# Patient Record
Sex: MALE | Race: WHITE | NOT HISPANIC OR LATINO | URBAN - METROPOLITAN AREA
[De-identification: names, ages, dates, MRNs, and addresses within clinical notes are randomized per-mention and may not be internally consistent; named-entity substitution may affect disease eponyms.]

---

## 2020-09-23 ENCOUNTER — OFFICE VISIT (OUTPATIENT)
Dept: URBAN - METROPOLITAN AREA CLINIC 63 | Facility: CLINIC | Age: 50
End: 2020-09-23

## 2021-03-10 ENCOUNTER — OFFICE VISIT (OUTPATIENT)
Dept: URBAN - METROPOLITAN AREA CLINIC 63 | Facility: CLINIC | Age: 51
End: 2021-03-10

## 2021-06-01 ENCOUNTER — OFFICE VISIT (OUTPATIENT)
Age: 51
End: 2021-06-01

## 2021-12-22 ENCOUNTER — TELEPHONE ENCOUNTER (OUTPATIENT)
Dept: URBAN - METROPOLITAN AREA CLINIC 9 | Facility: CLINIC | Age: 51
End: 2021-12-22

## 2021-12-23 ENCOUNTER — OFFICE VISIT (OUTPATIENT)
Dept: URBAN - METROPOLITAN AREA CLINIC 9 | Facility: CLINIC | Age: 51
End: 2021-12-23

## 2022-04-21 ENCOUNTER — TELEPHONE ENCOUNTER (OUTPATIENT)
Dept: URBAN - METROPOLITAN AREA CLINIC 9 | Facility: CLINIC | Age: 52
End: 2022-04-21

## 2022-04-29 ENCOUNTER — OFFICE VISIT (OUTPATIENT)
Dept: URBAN - METROPOLITAN AREA CLINIC 9 | Facility: CLINIC | Age: 52
End: 2022-04-29

## 2022-05-31 ENCOUNTER — LAB OUTSIDE AN ENCOUNTER (OUTPATIENT)
Age: 52
End: 2022-05-31

## 2022-06-01 ENCOUNTER — TELEPHONE ENCOUNTER (OUTPATIENT)
Dept: URBAN - METROPOLITAN AREA CLINIC 9 | Facility: CLINIC | Age: 52
End: 2022-06-01

## 2022-06-01 LAB
A/G RATIO: 1.3
AFP, SERUM, TUMOR MARKER: (no result)
ALBUMIN: (no result)
ALKALINE PHOSPHATASE: (no result)
ALT (SGPT): (no result)
AMBIG ABBREV CMP14 DEFAULT: (no result)
AST (SGOT): (no result)
BASO (ABSOLUTE): (no result)
BASOS: (no result)
BILIRUBIN, TOTAL: (no result)
BUN/CREATININE RATIO: 10
BUN: (no result)
CALCIUM: (no result)
CARBON DIOXIDE, TOTAL: (no result)
CHLORIDE: (no result)
CREATININE: (no result)
EGFR: (no result)
EOS (ABSOLUTE): (no result)
EOS: (no result)
GLOBULIN, TOTAL: (no result)
GLUCOSE: (no result)
HBSAG SCREEN: NEGATIVE
HEMATOCRIT: (no result)
HEMATOLOGY COMMENTS:: (no result)
HEMOGLOBIN: (no result)
HEP A AB, IGM: NEGATIVE
HEP A AB, TOTAL: POSITIVE
HEP C VIRUS AB: (no result)
HEPATITIS B SURF AB QUANT: (no result)
IMMATURE CELLS: (no result)
IMMATURE GRANS (ABS): (no result)
IMMATURE GRANULOCYTES: (no result)
INR: 1.1
LYMPHS (ABSOLUTE): (no result)
LYMPHS: (no result)
MCH: (no result)
MCHC: (no result)
MCV: (no result)
MONOCYTES(ABSOLUTE): (no result)
MONOCYTES: (no result)
NEUTROPHILS (ABSOLUTE): (no result)
NEUTROPHILS: (no result)
NRBC: (no result)
PLATELETS: (no result)
POTASSIUM: (no result)
PROTEIN, TOTAL: (no result)
PROTHROMBIN TIME: (no result)
RBC: (no result)
RDW: (no result)
SODIUM: (no result)
WBC: (no result)

## 2022-06-02 ENCOUNTER — TELEPHONE ENCOUNTER (OUTPATIENT)
Dept: URBAN - METROPOLITAN AREA CLINIC 9 | Facility: CLINIC | Age: 52
End: 2022-06-02

## 2022-06-03 ENCOUNTER — TELEPHONE ENCOUNTER (OUTPATIENT)
Dept: URBAN - METROPOLITAN AREA CLINIC 9 | Facility: CLINIC | Age: 52
End: 2022-06-03

## 2022-06-06 ENCOUNTER — LAB OUTSIDE AN ENCOUNTER (OUTPATIENT)
Age: 52
End: 2022-06-06

## 2022-06-06 ENCOUNTER — OFFICE VISIT (OUTPATIENT)
Dept: URBAN - METROPOLITAN AREA CLINIC 9 | Facility: CLINIC | Age: 52
End: 2022-06-06

## 2022-06-07 ENCOUNTER — TELEPHONE ENCOUNTER (OUTPATIENT)
Dept: URBAN - METROPOLITAN AREA CLINIC 9 | Facility: CLINIC | Age: 52
End: 2022-06-07

## 2022-06-07 LAB
BASO (ABSOLUTE): (no result)
BASOS: (no result)
EOS (ABSOLUTE): (no result)
EOS: (no result)
HEMATOCRIT: (no result)
HEMATOLOGY COMMENTS:: (no result)
HEMOGLOBIN: (no result)
IMMATURE CELLS: (no result)
IMMATURE GRANS (ABS): (no result)
IMMATURE GRANULOCYTES: (no result)
LYMPHS (ABSOLUTE): (no result)
LYMPHS: (no result)
MCH: (no result)
MCHC: (no result)
MCV: (no result)
MONOCYTES(ABSOLUTE): (no result)
MONOCYTES: (no result)
NEUTROPHILS (ABSOLUTE): (no result)
NEUTROPHILS: (no result)
NRBC: (no result)
PLATELETS: (no result)
RBC: (no result)
RDW: (no result)
WBC: (no result)

## 2022-06-08 ENCOUNTER — TELEPHONE ENCOUNTER (OUTPATIENT)
Dept: URBAN - METROPOLITAN AREA CLINIC 9 | Facility: CLINIC | Age: 52
End: 2022-06-08

## 2022-06-08 ENCOUNTER — OFFICE VISIT (OUTPATIENT)
Dept: URBAN - METROPOLITAN AREA CLINIC 9 | Facility: CLINIC | Age: 52
End: 2022-06-08

## 2022-06-14 ENCOUNTER — OFFICE VISIT (OUTPATIENT)
Dept: URBAN - METROPOLITAN AREA CLINIC 9 | Facility: CLINIC | Age: 52
End: 2022-06-14

## 2022-07-08 ENCOUNTER — OFFICE VISIT (OUTPATIENT)
Dept: URBAN - METROPOLITAN AREA SURGERY CENTER 9 | Facility: SURGERY CENTER | Age: 52
End: 2022-07-08

## 2022-07-09 ENCOUNTER — TELEPHONE ENCOUNTER (OUTPATIENT)
Dept: URBAN - METROPOLITAN AREA CLINIC 121 | Facility: CLINIC | Age: 52
End: 2022-07-09

## 2022-07-09 RX ORDER — METOPROLOL TARTRATE 25 MG/1
TABLET, FILM COATED ORAL ONCE A DAY
Refills: 0 | OUTPATIENT
Start: 2021-01-19 | End: 2021-03-10

## 2022-07-09 RX ORDER — METOPROLOL SUCCINATE 25 MG/1
TABLET, EXTENDED RELEASE ORAL ONCE A DAY
Refills: 0 | OUTPATIENT
Start: 2020-07-06 | End: 2020-09-23

## 2022-07-10 ENCOUNTER — TELEPHONE ENCOUNTER (OUTPATIENT)
Dept: URBAN - METROPOLITAN AREA CLINIC 121 | Facility: CLINIC | Age: 52
End: 2022-07-10

## 2022-07-10 RX ORDER — METOPROLOL TARTRATE 25 MG/1
TABLET, FILM COATED ORAL TWICE A DAY
Refills: 0 | Status: ACTIVE | COMMUNITY
Start: 2021-03-10

## 2022-07-10 RX ORDER — METOPROLOL SUCCINATE 25 MG/1
TABLET, EXTENDED RELEASE ORAL TWICE A DAY
Refills: 0 | Status: ACTIVE | COMMUNITY
Start: 2020-09-23

## 2022-07-10 RX ORDER — LIDOCAINE 50 MG/G
TAKE AS DIRECTED; AS NEEDED FOR RECTAL BLEEDING CREAM TOPICAL TAKE AS DIRECTED
Refills: 0 | Status: ACTIVE | COMMUNITY
Start: 2020-09-23

## 2022-07-30 ENCOUNTER — TELEPHONE ENCOUNTER (OUTPATIENT)
Age: 52
End: 2022-07-30

## 2022-07-30 RX ORDER — PANTOPRAZOLE 20 MG/1
1 TABLET, DELAYED RELEASE ORAL
Qty: 0 | Refills: 2 | OUTPATIENT
Start: 2022-04-29 | End: 2022-05-29

## 2022-07-30 RX ORDER — FOLIC ACID 1 MG/1
1 (ONE) TABLET ORAL DAILY
Qty: 0 | Refills: 2 | OUTPATIENT
Start: 2022-04-29 | End: 2022-05-29

## 2022-07-31 ENCOUNTER — TELEPHONE ENCOUNTER (OUTPATIENT)
Age: 52
End: 2022-07-31

## 2022-07-31 RX ORDER — DICYCLOMINE HYDROCHLORIDE 10 MG/1
1 CAPSULE ORAL TWICE A DAY
Qty: 0 | Refills: 16 | Status: ACTIVE | COMMUNITY

## 2022-07-31 RX ORDER — PANTOPRAZOLE 20 MG/1
1 TABLET, DELAYED RELEASE ORAL
Qty: 0 | Refills: 2 | Status: ACTIVE | COMMUNITY
Start: 2022-04-29

## 2022-07-31 RX ORDER — DICYCLOMINE HYDROCHLORIDE 10 MG/1
1 CAPSULE ORAL
Qty: 0 | Refills: 16 | Status: ACTIVE | COMMUNITY
Start: 2022-04-29

## 2022-07-31 RX ORDER — DICYCLOMINE HYDROCHLORIDE 10 MG/1
1 CAPSULE ORAL
Qty: 0 | Refills: 16 | Status: ACTIVE | COMMUNITY
Start: 2022-06-14

## 2022-07-31 RX ORDER — FOLIC ACID 1 MG/1
1 (ONE) TABLET ORAL DAILY
Qty: 0 | Refills: 2 | Status: ACTIVE | COMMUNITY
Start: 2022-04-29

## 2022-08-05 ENCOUNTER — TELEPHONE ENCOUNTER (OUTPATIENT)
Dept: URBAN - METROPOLITAN AREA CLINIC 7 | Facility: CLINIC | Age: 52
End: 2022-08-05

## 2022-08-08 ENCOUNTER — OFFICE VISIT (OUTPATIENT)
Dept: URBAN - METROPOLITAN AREA CLINIC 9 | Facility: CLINIC | Age: 52
End: 2022-08-08

## 2022-08-16 ENCOUNTER — WEB ENCOUNTER (OUTPATIENT)
Dept: URBAN - METROPOLITAN AREA CLINIC 9 | Facility: CLINIC | Age: 52
End: 2022-08-16

## 2022-08-22 ENCOUNTER — TELEPHONE ENCOUNTER (OUTPATIENT)
Dept: URBAN - METROPOLITAN AREA SURGERY CENTER 9 | Facility: SURGERY CENTER | Age: 52
End: 2022-08-22

## 2022-08-23 ENCOUNTER — TELEPHONE ENCOUNTER (OUTPATIENT)
Dept: URBAN - METROPOLITAN AREA SURGERY CENTER 9 | Facility: SURGERY CENTER | Age: 52
End: 2022-08-23

## 2022-08-25 ENCOUNTER — OFFICE VISIT (OUTPATIENT)
Dept: URBAN - METROPOLITAN AREA SURGERY CENTER 9 | Facility: SURGERY CENTER | Age: 52
End: 2022-08-25
Payer: COMMERCIAL

## 2022-08-25 ENCOUNTER — TELEPHONE ENCOUNTER (OUTPATIENT)
Dept: URBAN - METROPOLITAN AREA CLINIC 9 | Facility: CLINIC | Age: 52
End: 2022-08-25

## 2022-08-25 DIAGNOSIS — K74.60 ADVANCED CIRRHOSIS: ICD-10-CM

## 2022-08-25 DIAGNOSIS — K22.89 DILATATION OF ESOPHAGUS: ICD-10-CM

## 2022-08-25 DIAGNOSIS — K31.89 ACQUIRED DEFORMITY OF DUODENUM: ICD-10-CM

## 2022-08-25 DIAGNOSIS — I85.10 ESOPH VARICE OTHER DIS: ICD-10-CM

## 2022-08-25 PROCEDURE — 43235 EGD DIAGNOSTIC BRUSH WASH: CPT | Performed by: INTERNAL MEDICINE

## 2022-08-25 RX ORDER — METOPROLOL TARTRATE 25 MG/1
TABLET, FILM COATED ORAL TWICE A DAY
Refills: 0 | Status: ACTIVE | COMMUNITY
Start: 2021-03-10

## 2022-08-25 RX ORDER — METOPROLOL SUCCINATE 25 MG/1
TABLET, EXTENDED RELEASE ORAL TWICE A DAY
Refills: 0 | Status: ACTIVE | COMMUNITY
Start: 2020-09-23

## 2022-08-25 RX ORDER — FOLIC ACID 1 MG/1
1 (ONE) TABLET ORAL DAILY
Qty: 0 | Refills: 2 | Status: ACTIVE | COMMUNITY
Start: 2022-04-29

## 2022-08-25 RX ORDER — LIDOCAINE 50 MG/G
TAKE AS DIRECTED; AS NEEDED FOR RECTAL BLEEDING CREAM TOPICAL TAKE AS DIRECTED
Refills: 0 | Status: ACTIVE | COMMUNITY
Start: 2020-09-23

## 2022-08-25 RX ORDER — PANTOPRAZOLE 20 MG/1
1 TABLET, DELAYED RELEASE ORAL
Qty: 0 | Refills: 2 | Status: ACTIVE | COMMUNITY
Start: 2022-04-29

## 2022-08-25 RX ORDER — DICYCLOMINE HYDROCHLORIDE 10 MG/1
1 CAPSULE ORAL
Qty: 0 | Refills: 16 | Status: ACTIVE | COMMUNITY
Start: 2022-06-14

## 2022-09-06 ENCOUNTER — TELEPHONE ENCOUNTER (OUTPATIENT)
Dept: URBAN - METROPOLITAN AREA CLINIC 7 | Facility: CLINIC | Age: 52
End: 2022-09-06

## 2022-09-07 ENCOUNTER — TELEPHONE ENCOUNTER (OUTPATIENT)
Dept: URBAN - METROPOLITAN AREA CLINIC 7 | Facility: CLINIC | Age: 52
End: 2022-09-07

## 2022-11-02 ENCOUNTER — WEB ENCOUNTER (OUTPATIENT)
Dept: URBAN - METROPOLITAN AREA CLINIC 9 | Facility: CLINIC | Age: 52
End: 2022-11-02

## 2023-02-07 ENCOUNTER — OFFICE VISIT (OUTPATIENT)
Dept: URBAN - METROPOLITAN AREA CLINIC 9 | Facility: CLINIC | Age: 53
End: 2023-02-07
Payer: COMMERCIAL

## 2023-02-07 ENCOUNTER — TELEPHONE ENCOUNTER (OUTPATIENT)
Dept: URBAN - METROPOLITAN AREA CLINIC 7 | Facility: CLINIC | Age: 53
End: 2023-02-07

## 2023-02-07 ENCOUNTER — WEB ENCOUNTER (OUTPATIENT)
Dept: URBAN - METROPOLITAN AREA CLINIC 9 | Facility: CLINIC | Age: 53
End: 2023-02-07

## 2023-02-07 VITALS
WEIGHT: 194 LBS | BODY MASS INDEX: 22.45 KG/M2 | HEIGHT: 78 IN | SYSTOLIC BLOOD PRESSURE: 124 MMHG | DIASTOLIC BLOOD PRESSURE: 84 MMHG

## 2023-02-07 DIAGNOSIS — K62.7 RADIATION PROCTITIS: ICD-10-CM

## 2023-02-07 DIAGNOSIS — F10.10 ALCOHOL ABUSE, CONTINUOUS: ICD-10-CM

## 2023-02-07 DIAGNOSIS — K76.82 HEPATIC ENCEPHALOPATHY: ICD-10-CM

## 2023-02-07 DIAGNOSIS — K58.9 IRRITABLE BOWEL SYNDROME, UNSPECIFIED TYPE: ICD-10-CM

## 2023-02-07 DIAGNOSIS — K70.30 ALCOHOLIC CIRRHOSIS OF LIVER WITHOUT ASCITES: ICD-10-CM

## 2023-02-07 DIAGNOSIS — K21.9 GASTROESOPHAGEAL REFLUX DISEASE, UNSPECIFIED WHETHER ESOPHAGITIS PRESENT: ICD-10-CM

## 2023-02-07 PROBLEM — 13920009 HEPATIC ENCEPHALOPATHY: Status: ACTIVE | Noted: 2023-02-07

## 2023-02-07 PROCEDURE — 99214 OFFICE O/P EST MOD 30 MIN: CPT | Performed by: INTERNAL MEDICINE

## 2023-02-07 RX ORDER — RIFAXIMIN 550 MG/1
1 TABLET TABLET ORAL TWICE A DAY
Qty: 60 | Refills: 5 | OUTPATIENT
Start: 2023-02-07 | End: 2023-08-06

## 2023-02-07 RX ORDER — METOPROLOL SUCCINATE 25 MG/1
TABLET, EXTENDED RELEASE ORAL TWICE A DAY
Refills: 0 | Status: ON HOLD | COMMUNITY
Start: 2020-09-23

## 2023-02-07 RX ORDER — FOLIC ACID 1 MG/1
1 (ONE) TABLET ORAL DAILY
Qty: 0 | Refills: 2 | Status: ON HOLD | COMMUNITY
Start: 2022-04-29

## 2023-02-07 RX ORDER — PANTOPRAZOLE 20 MG/1
1 TABLET, DELAYED RELEASE ORAL
Qty: 0 | Refills: 2 | Status: ON HOLD | COMMUNITY
Start: 2022-04-29

## 2023-02-07 RX ORDER — LIDOCAINE 50 MG/G
TAKE AS DIRECTED; AS NEEDED FOR RECTAL BLEEDING CREAM TOPICAL TAKE AS DIRECTED
Refills: 0 | Status: ON HOLD | COMMUNITY
Start: 2020-09-23

## 2023-02-07 RX ORDER — DICYCLOMINE HYDROCHLORIDE 10 MG/1
1 CAPSULE ORAL
OUTPATIENT
Start: 2022-06-14

## 2023-02-07 RX ORDER — RIFAXIMIN 550 MG/1
1 TABLET TABLET ORAL TWICE A DAY
Qty: 60 | Refills: 5
Start: 2023-02-07 | End: 2023-08-12

## 2023-02-07 RX ORDER — FOLIC ACID 1 MG/1
1 (ONE) TABLET ORAL DAILY
OUTPATIENT
Start: 2022-04-29

## 2023-02-07 RX ORDER — DICYCLOMINE HYDROCHLORIDE 10 MG/1
1 CAPSULE ORAL
Qty: 0 | Refills: 16 | Status: ACTIVE | COMMUNITY
Start: 2022-06-14

## 2023-02-07 RX ORDER — METOPROLOL TARTRATE 25 MG/1
TABLET, FILM COATED ORAL TWICE A DAY
Refills: 0 | Status: ACTIVE | COMMUNITY
Start: 2021-03-10

## 2023-02-07 NOTE — HPI-TODAY'S VISIT:
Pt here for f/u of etoh cirrhosis and IBS and radiation proctitis . pt has etoh cirrhosis, still ongoing etoh Hx/o SCC anus . Dx in 2021 . 2020 Colon negative 2021 EGD with small varices 2021 Liver bx with cirrhosis and fatty liver changes ([facility]) 2022 colon with polyp and radiation proctitis 2022 EGD with duodenal AVMs . Prior IVDU, none currently . 12/2022 CT a/p negative for tumor 12/2022 MELD 10 . Still drinking 18 per day and knows he needs to quit. He has the contact information for the center. . 2022 pt with symptomatic anemia, sent to hospital and PRBC transfused from duodenal AVMS and he still is having rectal bleeding. At this point I am worried about ongoing radiation proctitis and he didn't schedule the radiation proctitis APC. I advised we reschedule and that we plan on xifaxan for HE given he failed lactulose. I will plan RTC in 3 months with repeat CBC, CMP, INR and AFP. He is agreeable. .

## 2023-02-07 NOTE — PHYSICAL EXAM NEUROLOGIC:
oriented to person, place and time , normal sensation  , cooperative with exam, normal mood with an appropriate affect , oriented to person, place and time , normal sensation  , cooperative with exam, normal mood with an appropriate affect

## 2023-02-07 NOTE — PHYSICAL EXAM GASTROINTESTINAL
Abdomen , soft, nontender, nondistended , no guarding or rigidity , no masses palpable , Liver and Spleen , no hepatomegaly present , Abdomen , soft, nontender, nondistended , no guarding or rigidity , no masses palpable , Liver and Spleen , no hepatomegaly present

## 2023-04-01 ENCOUNTER — LAB OUTSIDE AN ENCOUNTER (OUTPATIENT)
Dept: URBAN - METROPOLITAN AREA CLINIC 9 | Facility: CLINIC | Age: 53
End: 2023-04-01

## 2023-05-16 ENCOUNTER — OFFICE VISIT (OUTPATIENT)
Dept: URBAN - METROPOLITAN AREA CLINIC 9 | Facility: CLINIC | Age: 53
End: 2023-05-16
Payer: COMMERCIAL

## 2023-05-16 ENCOUNTER — WEB ENCOUNTER (OUTPATIENT)
Dept: URBAN - METROPOLITAN AREA CLINIC 9 | Facility: CLINIC | Age: 53
End: 2023-05-16

## 2023-05-16 VITALS
SYSTOLIC BLOOD PRESSURE: 124 MMHG | BODY MASS INDEX: 23.49 KG/M2 | WEIGHT: 203 LBS | DIASTOLIC BLOOD PRESSURE: 74 MMHG | HEIGHT: 78 IN

## 2023-05-16 DIAGNOSIS — K76.82 HEPATIC ENCEPHALOPATHY: ICD-10-CM

## 2023-05-16 DIAGNOSIS — K70.30 ALCOHOLIC CIRRHOSIS OF LIVER WITHOUT ASCITES: ICD-10-CM

## 2023-05-16 DIAGNOSIS — K62.7 RADIATION PROCTITIS: ICD-10-CM

## 2023-05-16 DIAGNOSIS — K55.20 SMALL BOWEL ARTERIOVENOUS MALFORMATION: ICD-10-CM

## 2023-05-16 DIAGNOSIS — F10.10 ALCOHOL ABUSE, CONTINUOUS: ICD-10-CM

## 2023-05-16 PROCEDURE — 99214 OFFICE O/P EST MOD 30 MIN: CPT | Performed by: INTERNAL MEDICINE

## 2023-05-16 RX ORDER — METOPROLOL SUCCINATE 25 MG/1
TABLET, EXTENDED RELEASE ORAL TWICE A DAY
Refills: 0 | Status: ON HOLD | COMMUNITY
Start: 2020-09-23

## 2023-05-16 RX ORDER — PANTOPRAZOLE 20 MG/1
1 TABLET, DELAYED RELEASE ORAL
Qty: 0 | Refills: 2 | Status: ON HOLD | COMMUNITY
Start: 2022-04-29

## 2023-05-16 RX ORDER — LIDOCAINE 50 MG/G
TAKE AS DIRECTED; AS NEEDED FOR RECTAL BLEEDING CREAM TOPICAL TAKE AS DIRECTED
Refills: 0 | Status: ON HOLD | COMMUNITY
Start: 2020-09-23

## 2023-05-16 RX ORDER — FOLIC ACID 1 MG/1
1 (ONE) TABLET ORAL DAILY
OUTPATIENT
Start: 2022-04-29

## 2023-05-16 RX ORDER — DICYCLOMINE HYDROCHLORIDE 10 MG/1
1 CAPSULE ORAL
Status: ACTIVE | COMMUNITY
Start: 2022-06-14

## 2023-05-16 RX ORDER — FUROSEMIDE 20 MG/1
1 TABLET TABLET ORAL ONCE A DAY
Qty: 30 | Refills: 1 | OUTPATIENT
Start: 2023-05-16

## 2023-05-16 RX ORDER — RIFAXIMIN 550 MG/1
1 TABLET TABLET ORAL TWICE A DAY
Qty: 60 | Refills: 5 | Status: ACTIVE | COMMUNITY
Start: 2023-02-07 | End: 2023-08-12

## 2023-05-16 RX ORDER — SPIRONOLACTONE 25 MG/1
TAKE 1 TABLET BY MOUTH EVERY DAY TABLET ORAL
Qty: 30 EACH | Refills: 0 | Status: ACTIVE | COMMUNITY

## 2023-05-16 RX ORDER — RIFAXIMIN 550 MG/1
1 TABLET TABLET ORAL TWICE A DAY
OUTPATIENT
Start: 2023-02-07

## 2023-05-16 RX ORDER — DICYCLOMINE HYDROCHLORIDE 10 MG/1
1 CAPSULE ORAL
Qty: 90 | Refills: 3 | OUTPATIENT
Start: 2022-06-14

## 2023-05-16 RX ORDER — METOPROLOL TARTRATE 25 MG/1
TABLET, FILM COATED ORAL TWICE A DAY
Refills: 0 | Status: ACTIVE | COMMUNITY
Start: 2021-03-10

## 2023-05-16 RX ORDER — SPIRONOLACTONE 25 MG/1
1 TABLET TABLET ORAL DAILY
Qty: 30 TABLET | Refills: 1 | OUTPATIENT

## 2023-05-16 RX ORDER — FOLIC ACID 1 MG/1
1 (ONE) TABLET ORAL DAILY
Status: ACTIVE | COMMUNITY
Start: 2022-04-29

## 2023-05-16 NOTE — HPI-TODAY'S VISIT:
Pt here for f/u of etoh cirrhosis and IBS and radiation proctitis . pt has etoh cirrhosis, still ongoing etoh Hx/o SCC anus . Dx in 2021 . 2020 Colon negative 2021 EGD with small varices 2021 Liver bx with cirrhosis and fatty liver changes ([facility]EL[facility]) 2022 colon with polyp and radiation proctitis 2022 EGD with duodenal AVMs . Prior IVDU, none currently . 12/2022 CT a/p negative for tumor 12/2022 MELD 10 4/2023 MELD 14 . Still drinking 18 per day and knows he needs to quit. He has the contact information for the center. . Pt with symptomatic anemia. Pt is having rectal bleeding and due to concern about radiation proctitis I advised about flex sig with APC. He is on xifaxan for his HE but still feels symptomatic. MELD Labs 4/2023 at 14. He is now considering scheduling the APC for radiation proctitis. I also advised about Push enteroscopy for APC. He understands and is considering ED evaluation due to ongoing GI bleeding. He needs diuretics with lasix and aldactone which we will start. RTC 6 weeks.  . .

## 2023-06-22 ENCOUNTER — TELEPHONE ENCOUNTER (OUTPATIENT)
Dept: URBAN - METROPOLITAN AREA CLINIC 9 | Facility: CLINIC | Age: 53
End: 2023-06-22

## 2023-06-22 RX ORDER — FUROSEMIDE 20 MG/1
1 TABLET TABLET ORAL ONCE A DAY
Qty: 30 | Refills: 3
Start: 2023-05-16

## 2023-06-30 ENCOUNTER — OFFICE VISIT (OUTPATIENT)
Dept: URBAN - METROPOLITAN AREA CLINIC 9 | Facility: CLINIC | Age: 53
End: 2023-06-30

## 2023-07-13 ENCOUNTER — TELEPHONE ENCOUNTER (OUTPATIENT)
Dept: URBAN - METROPOLITAN AREA CLINIC 9 | Facility: CLINIC | Age: 53
End: 2023-07-13

## 2023-07-13 RX ORDER — SPIRONOLACTONE 25 MG/1
1 TABLET TABLET ORAL DAILY
Qty: 30 TABLET | Refills: 3

## 2023-08-03 ENCOUNTER — OFFICE VISIT (OUTPATIENT)
Dept: URBAN - METROPOLITAN AREA CLINIC 9 | Facility: CLINIC | Age: 53
End: 2023-08-03
Payer: COMMERCIAL

## 2023-08-03 ENCOUNTER — TELEPHONE ENCOUNTER (OUTPATIENT)
Dept: URBAN - METROPOLITAN AREA CLINIC 9 | Facility: CLINIC | Age: 53
End: 2023-08-03

## 2023-08-03 VITALS
BODY MASS INDEX: 21.29 KG/M2 | SYSTOLIC BLOOD PRESSURE: 128 MMHG | WEIGHT: 184 LBS | DIASTOLIC BLOOD PRESSURE: 70 MMHG | HEIGHT: 78 IN

## 2023-08-03 DIAGNOSIS — K21.9 GASTROESOPHAGEAL REFLUX DISEASE, UNSPECIFIED WHETHER ESOPHAGITIS PRESENT: ICD-10-CM

## 2023-08-03 DIAGNOSIS — F10.10 ALCOHOL ABUSE, CONTINUOUS: ICD-10-CM

## 2023-08-03 DIAGNOSIS — K70.30 ALCOHOLIC CIRRHOSIS OF LIVER WITHOUT ASCITES: ICD-10-CM

## 2023-08-03 DIAGNOSIS — K62.7 RADIATION PROCTITIS: ICD-10-CM

## 2023-08-03 PROCEDURE — 99214 OFFICE O/P EST MOD 30 MIN: CPT | Performed by: INTERNAL MEDICINE

## 2023-08-03 RX ORDER — METOPROLOL TARTRATE 25 MG/1
TABLET, FILM COATED ORAL TWICE A DAY
Refills: 0 | Status: ACTIVE | COMMUNITY
Start: 2021-03-10

## 2023-08-03 RX ORDER — FUROSEMIDE 20 MG/1
1 TABLET TABLET ORAL ONCE A DAY
OUTPATIENT
Start: 2023-05-16

## 2023-08-03 RX ORDER — METOPROLOL SUCCINATE 25 MG/1
TABLET, EXTENDED RELEASE ORAL TWICE A DAY
Refills: 0 | Status: ON HOLD | COMMUNITY
Start: 2020-09-23

## 2023-08-03 RX ORDER — LIDOCAINE 50 MG/G
TAKE AS DIRECTED; AS NEEDED FOR RECTAL BLEEDING CREAM TOPICAL TAKE AS DIRECTED
Refills: 0 | Status: ON HOLD | COMMUNITY
Start: 2020-09-23

## 2023-08-03 RX ORDER — RIFAXIMIN 550 MG/1
1 TABLET TABLET ORAL TWICE A DAY
OUTPATIENT
Start: 2023-02-07

## 2023-08-03 RX ORDER — FOLIC ACID 1 MG/1
1 (ONE) TABLET ORAL DAILY
Status: ACTIVE | COMMUNITY
Start: 2022-04-29

## 2023-08-03 RX ORDER — PANTOPRAZOLE 20 MG/1
1 TABLET, DELAYED RELEASE ORAL
Qty: 0 | Refills: 2 | Status: ON HOLD | COMMUNITY
Start: 2022-04-29

## 2023-08-03 RX ORDER — FUROSEMIDE 20 MG/1
1 TABLET TABLET ORAL ONCE A DAY
Qty: 30 | Refills: 3 | Status: ACTIVE | COMMUNITY
Start: 2023-05-16

## 2023-08-03 RX ORDER — SPIRONOLACTONE 25 MG/1
1 TABLET TABLET ORAL DAILY
Qty: 30 TABLET | Refills: 3 | Status: ACTIVE | COMMUNITY

## 2023-08-03 RX ORDER — SPIRONOLACTONE 25 MG/1
1 TABLET TABLET ORAL DAILY
OUTPATIENT

## 2023-08-03 RX ORDER — DICYCLOMINE HYDROCHLORIDE 10 MG/1
1 CAPSULE ORAL
Qty: 90 | Refills: 3 | Status: ACTIVE | COMMUNITY
Start: 2022-06-14

## 2023-08-03 RX ORDER — FOLIC ACID 1 MG/1
1 (ONE) TABLET ORAL DAILY
OUTPATIENT
Start: 2022-04-29

## 2023-08-03 RX ORDER — RIFAXIMIN 550 MG/1
1 TABLET TABLET ORAL TWICE A DAY
Status: ACTIVE | COMMUNITY
Start: 2023-02-07

## 2023-08-03 NOTE — HPI-TODAY'S VISIT:
Pt here for f/u of etoh cirrhosis and IBS and radiation proctitis . pt has etoh cirrhosis, still ongoing etoh Hx/o SCC anus . Dx in 2021 . 2020 Colon negative 2021 EGD with small varices 2021 Liver bx with cirrhosis and fatty liver changes ([facility]ELMO) 2022 colon with polyp and radiation proctitis 2022 EGD with duodenal AVMs 2023 Colon with APC of extensive radiation proctitis . Prior IVDU, none currently . 12/2022 CT a/p negative for tumor 3/2023 MRI liver neg for tumor . 12/2022 MELD 10 4/2023 MELD 14 7/2023 CBC with drop in hgb to 5.8.  . Pt still drinking etoh daily and refuses to stop despite discussion of r/b/a.  . His fluid is improved on the diuretics.  . We need updated liver imaging for HCC with MRI. he will  likely need repeat APC I suspect due to ongoing bleeding after last colon. There is discussion about starting octreotide for radiation proctitis which is reasonable as this year he has needed 25 units or PRBC. He is going to speak to Dr. Ledezma to see if they can arrange the depot octreotide, otherwise we could consider octreotide 50mcg BID. He is being scheduled for a PRBC now for his hgb of 5.8 . RTC 1 mo .

## 2023-08-20 ENCOUNTER — TELEPHONE ENCOUNTER (OUTPATIENT)
Dept: URBAN - METROPOLITAN AREA CLINIC 9 | Facility: CLINIC | Age: 53
End: 2023-08-20

## 2023-09-06 ENCOUNTER — OFFICE VISIT (OUTPATIENT)
Dept: URBAN - METROPOLITAN AREA CLINIC 9 | Facility: CLINIC | Age: 53
End: 2023-09-06
Payer: COMMERCIAL

## 2023-09-06 VITALS
BODY MASS INDEX: 22.21 KG/M2 | DIASTOLIC BLOOD PRESSURE: 68 MMHG | HEIGHT: 78 IN | SYSTOLIC BLOOD PRESSURE: 136 MMHG | WEIGHT: 192 LBS

## 2023-09-06 DIAGNOSIS — K21.9 GASTROESOPHAGEAL REFLUX DISEASE WITHOUT ESOPHAGITIS: ICD-10-CM

## 2023-09-06 DIAGNOSIS — K70.30 ALCOHOLIC CIRRHOSIS OF LIVER WITHOUT ASCITES: ICD-10-CM

## 2023-09-06 DIAGNOSIS — F10.10 ALCOHOL ABUSE, CONTINUOUS: ICD-10-CM

## 2023-09-06 PROBLEM — 266435005: Status: ACTIVE | Noted: 2023-09-06

## 2023-09-06 PROCEDURE — 99214 OFFICE O/P EST MOD 30 MIN: CPT | Performed by: INTERNAL MEDICINE

## 2023-09-06 RX ORDER — METOPROLOL TARTRATE 25 MG/1
TABLET, FILM COATED ORAL TWICE A DAY
Refills: 0 | Status: ACTIVE | COMMUNITY
Start: 2021-03-10

## 2023-09-06 RX ORDER — LIDOCAINE 50 MG/G
TAKE AS DIRECTED; AS NEEDED FOR RECTAL BLEEDING CREAM TOPICAL TAKE AS DIRECTED
Refills: 0 | Status: ON HOLD | COMMUNITY
Start: 2020-09-23

## 2023-09-06 RX ORDER — RIFAXIMIN 550 MG/1
1 TABLET TABLET ORAL TWICE A DAY
Status: ACTIVE | COMMUNITY
Start: 2023-02-07

## 2023-09-06 RX ORDER — FUROSEMIDE 20 MG/1
1 TABLET TABLET ORAL ONCE A DAY
OUTPATIENT
Start: 2023-05-16

## 2023-09-06 RX ORDER — DICYCLOMINE HYDROCHLORIDE 10 MG/1
1 CAPSULE ORAL
Qty: 90 | Refills: 3 | Status: ACTIVE | COMMUNITY
Start: 2022-06-14

## 2023-09-06 RX ORDER — METOPROLOL SUCCINATE 25 MG/1
TABLET, EXTENDED RELEASE ORAL TWICE A DAY
Refills: 0 | Status: ON HOLD | COMMUNITY
Start: 2020-09-23

## 2023-09-06 RX ORDER — FUROSEMIDE 20 MG/1
1 TABLET TABLET ORAL ONCE A DAY
Status: ACTIVE | COMMUNITY
Start: 2023-05-16

## 2023-09-06 RX ORDER — RIFAXIMIN 550 MG/1
1 TABLET TABLET ORAL TWICE A DAY
OUTPATIENT
Start: 2023-02-07

## 2023-09-06 RX ORDER — FOLIC ACID 1 MG/1
1 (ONE) TABLET ORAL DAILY
Status: ACTIVE | COMMUNITY
Start: 2022-04-29

## 2023-09-06 RX ORDER — SPIRONOLACTONE 25 MG/1
1 TABLET TABLET ORAL DAILY
OUTPATIENT

## 2023-09-06 RX ORDER — SPIRONOLACTONE 25 MG/1
1 TABLET TABLET ORAL DAILY
Status: ACTIVE | COMMUNITY

## 2023-09-06 RX ORDER — PANTOPRAZOLE 20 MG/1
1 TABLET, DELAYED RELEASE ORAL
Qty: 0 | Refills: 2 | Status: ON HOLD | COMMUNITY
Start: 2022-04-29

## 2023-09-06 RX ORDER — FOLIC ACID 1 MG/1
1 (ONE) TABLET ORAL DAILY
OUTPATIENT
Start: 2022-04-29

## 2023-09-06 NOTE — HPI-TODAY'S VISIT:
Pt here for f/u of etoh cirrhosis and IBS and radiation proctitis . pt has etoh cirrhosis, still ongoing etoh Hx/o SCC anus . Dx in 2021 . 2020 Colon negative 2021 EGD with small varices 2021 Liver bx with cirrhosis and fatty liver changes ([facility]ELMO) 2022 colon with polyp and radiation proctitis 2022 EGD with duodenal AVMs 2023 Colon with APC of extensive radiation proctitis . Prior IVDU, none currently . 12/2022 CT a/p negative for tumor 3/2023 MRI liver neg for tumor 8/2023 MRI liver with cirrhosis . 12/2022 MELD 10 4/2023 MELD 14 7/2023 CBC with drop in hgb to 5.8.  8/2023 AFP elevated to 12 . Pt still drinking etoh daily and refuses to stop despite discussion of r/b/a.  . His fluid is improved on the diuretics. He is stable. His HCC screening was negative. He is going to start long acting octreotide.  We also discussed his need to wean alcohol. RTC 3 mo.  .

## 2023-11-14 ENCOUNTER — TELEPHONE ENCOUNTER (OUTPATIENT)
Dept: URBAN - METROPOLITAN AREA CLINIC 9 | Facility: CLINIC | Age: 53
End: 2023-11-14

## 2023-11-14 RX ORDER — DICYCLOMINE HYDROCHLORIDE 10 MG/1
1 CAPSULE CAPSULE ORAL
Qty: 270 | Refills: 0
Start: 2022-06-14 | End: 2024-02-12

## 2023-11-25 ENCOUNTER — P2P PATIENT RECORD (OUTPATIENT)
Age: 53
End: 2023-11-25

## 2023-11-28 ENCOUNTER — TELEPHONE ENCOUNTER (OUTPATIENT)
Dept: URBAN - METROPOLITAN AREA CLINIC 9 | Facility: CLINIC | Age: 53
End: 2023-11-28

## 2023-11-28 RX ORDER — SPIRONOLACTONE 25 MG/1
1 TABLET TABLET ORAL DAILY
Qty: 90 | Refills: 1

## 2023-11-29 ENCOUNTER — TELEPHONE ENCOUNTER (OUTPATIENT)
Dept: URBAN - METROPOLITAN AREA CLINIC 9 | Facility: CLINIC | Age: 53
End: 2023-11-29

## 2023-11-29 RX ORDER — FUROSEMIDE 20 MG/1
1 TABLET TABLET ORAL ONCE A DAY
Qty: 30 | Refills: 3
Start: 2023-05-16

## 2024-03-04 ENCOUNTER — OV EP (OUTPATIENT)
Dept: URBAN - METROPOLITAN AREA CLINIC 9 | Facility: CLINIC | Age: 54
End: 2024-03-04

## 2024-03-14 ENCOUNTER — OV EP (OUTPATIENT)
Dept: URBAN - METROPOLITAN AREA CLINIC 9 | Facility: CLINIC | Age: 54
End: 2024-03-14
Payer: COMMERCIAL

## 2024-03-14 VITALS
SYSTOLIC BLOOD PRESSURE: 124 MMHG | BODY MASS INDEX: 21.52 KG/M2 | DIASTOLIC BLOOD PRESSURE: 86 MMHG | WEIGHT: 186 LBS | HEIGHT: 78 IN

## 2024-03-14 DIAGNOSIS — K70.30 ALCOHOLIC CIRRHOSIS OF LIVER WITHOUT ASCITES: ICD-10-CM

## 2024-03-14 DIAGNOSIS — K21.9 GASTROESOPHAGEAL REFLUX DISEASE WITHOUT ESOPHAGITIS: ICD-10-CM

## 2024-03-14 DIAGNOSIS — K55.20 SMALL BOWEL ARTERIOVENOUS MALFORMATION: ICD-10-CM

## 2024-03-14 PROCEDURE — 99214 OFFICE O/P EST MOD 30 MIN: CPT | Performed by: INTERNAL MEDICINE

## 2024-03-14 RX ORDER — FUROSEMIDE 20 MG/1
1 TABLET TABLET ORAL ONCE A DAY
Qty: 30 | Refills: 3 | Status: ACTIVE | COMMUNITY
Start: 2023-05-16

## 2024-03-14 RX ORDER — RIFAXIMIN 550 MG/1
1 TABLET TABLET ORAL TWICE A DAY
OUTPATIENT
Start: 2023-02-07

## 2024-03-14 RX ORDER — PANTOPRAZOLE SODIUM 40 MG/1
TAKE 1 TABLET BY MOUTH TWICE A DAY BEFORE MEALS TABLET, DELAYED RELEASE ORAL
Qty: 60 EACH | Refills: 0 | Status: ACTIVE | COMMUNITY

## 2024-03-14 RX ORDER — DICYCLOMINE HYDROCHLORIDE 10 MG/1
1 CAPSULE CAPSULE ORAL
OUTPATIENT
Start: 2024-02-26

## 2024-03-14 RX ORDER — SPIRONOLACTONE 25 MG/1
1 TABLET TABLET ORAL DAILY
Qty: 90 | Refills: 1 | Status: ACTIVE | COMMUNITY

## 2024-03-14 RX ORDER — METOPROLOL SUCCINATE 25 MG/1
TABLET, EXTENDED RELEASE ORAL TWICE A DAY
Refills: 0 | Status: ON HOLD | COMMUNITY
Start: 2020-09-23

## 2024-03-14 RX ORDER — PANTOPRAZOLE 20 MG/1
1 TABLET, DELAYED RELEASE ORAL
Qty: 0 | Refills: 2 | Status: ON HOLD | COMMUNITY
Start: 2022-04-29

## 2024-03-14 RX ORDER — RIFAXIMIN 550 MG/1
1 TABLET TABLET ORAL TWICE A DAY
Status: ACTIVE | COMMUNITY
Start: 2023-02-07

## 2024-03-14 RX ORDER — FOLIC ACID 1 MG/1
1 (ONE) TABLET ORAL DAILY
Status: ACTIVE | COMMUNITY
Start: 2022-04-29

## 2024-03-14 RX ORDER — METOPROLOL TARTRATE 25 MG/1
TABLET, FILM COATED ORAL TWICE A DAY
Refills: 0 | Status: ACTIVE | COMMUNITY
Start: 2021-03-10

## 2024-03-14 RX ORDER — FOLIC ACID 1 MG/1
1 (ONE) TABLET ORAL DAILY
OUTPATIENT
Start: 2022-04-29

## 2024-03-14 RX ORDER — PANTOPRAZOLE SODIUM 40 MG/1
TAKE 1 TABLET BY MOUTH TWICE A DAY BEFORE MEALS TABLET, DELAYED RELEASE ORAL
OUTPATIENT

## 2024-03-14 RX ORDER — FUROSEMIDE 20 MG/1
1 TABLET TABLET ORAL ONCE A DAY
OUTPATIENT
Start: 2023-05-16

## 2024-03-14 RX ORDER — SPIRONOLACTONE 25 MG/1
1 TABLET TABLET ORAL DAILY
OUTPATIENT

## 2024-03-14 RX ORDER — LIDOCAINE 50 MG/G
TAKE AS DIRECTED; AS NEEDED FOR RECTAL BLEEDING CREAM TOPICAL TAKE AS DIRECTED
Refills: 0 | Status: ON HOLD | COMMUNITY
Start: 2020-09-23

## 2024-03-14 RX ORDER — DICYCLOMINE HYDROCHLORIDE 10 MG/1
1 CAPSULE CAPSULE ORAL
Qty: 270 | Refills: 0 | Status: ACTIVE | COMMUNITY
Start: 2024-02-26 | End: 2024-05-26

## 2024-03-14 NOTE — HPI-TODAY'S VISIT:
Pt here for f/u of etoh cirrhosis and IBS and radiation proctitis . pt has etoh cirrhosis, still ongoing etoh Hx/o SCC anus . 2020 Colon negative 2021 EGD with small varices 2021 Liver bx with cirrhosis and fatty liver changes (ANTELMO) 2022 colon with polyp and radiation proctitis 2022 EGD with duodenal AVMs 2023 Colon with APC of extensive radiation proctitis . Prior IVDU, none currently . 12/2022 CT a/p negative for tumor 3/2023 MRI liver neg for tumor 8/2023 MRI liver with cirrhosis no tumor . 12/2022 MELD 10 4/2023 MELD 14 7/2023 CBC with drop in hgb to 5.8.  8/2023 AFP elevated to 12 . Pt still drinking etoh daily and refuses to stop despite discussion of r/b/a. He now has reduced to 10 drinks per day. . His platelets are around 30k and he is trying to get approval for medical therapy to improve that. he is already on monthly octreotide. He is up to date on imaging for HCC and EGD for viariceal evaluation. He has had recent EGD andf colon and although he has Gi bleeding with platelets of 30 k he cannot have procedures/apc etc. He is going for plt and prbc this weekend and that hopefully will improve bleeding if his plt can be raised over 50 k. I advised he needs to cont close hematologic eval and prbc and consideration for hospital eval based on hematology suggestion. RTC 3 mo. . . His fluid is improved on the diuretics. He is stable. His HCC screening was negative. He is going to start long acting octreotide.  We also discussed his need to wean alcohol. RTC 3 mo.  .

## 2024-05-28 ENCOUNTER — TELEPHONE ENCOUNTER (OUTPATIENT)
Dept: URBAN - METROPOLITAN AREA CLINIC 9 | Facility: CLINIC | Age: 54
End: 2024-05-28

## 2024-05-28 RX ORDER — DICYCLOMINE HYDROCHLORIDE 10 MG/1
1 CAPSULE CAPSULE ORAL
Qty: 270 | Refills: 0
Start: 2024-02-26 | End: 2024-08-26

## 2024-06-11 ENCOUNTER — OFFICE VISIT (OUTPATIENT)
Dept: URBAN - METROPOLITAN AREA CLINIC 9 | Facility: CLINIC | Age: 54
End: 2024-06-11
Payer: COMMERCIAL

## 2024-06-11 ENCOUNTER — DASHBOARD ENCOUNTERS (OUTPATIENT)
Age: 54
End: 2024-06-11

## 2024-06-11 VITALS
SYSTOLIC BLOOD PRESSURE: 162 MMHG | DIASTOLIC BLOOD PRESSURE: 86 MMHG | HEIGHT: 78 IN | WEIGHT: 178 LBS | BODY MASS INDEX: 20.59 KG/M2

## 2024-06-11 DIAGNOSIS — K76.82 HEPATIC ENCEPHALOPATHY: ICD-10-CM

## 2024-06-11 DIAGNOSIS — K70.30 ALCOHOLIC CIRRHOSIS OF LIVER WITHOUT ASCITES: ICD-10-CM

## 2024-06-11 DIAGNOSIS — K21.9 GASTROESOPHAGEAL REFLUX DISEASE WITHOUT ESOPHAGITIS: ICD-10-CM

## 2024-06-11 PROCEDURE — 99214 OFFICE O/P EST MOD 30 MIN: CPT | Performed by: INTERNAL MEDICINE

## 2024-06-11 RX ORDER — PANTOPRAZOLE SODIUM 40 MG/1
AS DIRECTED TABLET, DELAYED RELEASE ORAL TWICE A DAY
OUTPATIENT

## 2024-06-11 RX ORDER — LIDOCAINE 50 MG/G
TAKE AS DIRECTED; AS NEEDED FOR RECTAL BLEEDING CREAM TOPICAL TAKE AS DIRECTED
Refills: 0 | Status: ON HOLD | COMMUNITY
Start: 2020-09-23

## 2024-06-11 RX ORDER — METOPROLOL TARTRATE 25 MG/1
TABLET, FILM COATED ORAL TWICE A DAY
Refills: 0 | Status: ACTIVE | COMMUNITY
Start: 2021-03-10

## 2024-06-11 RX ORDER — FINASTERIDE 5 MG/1
TAKE 1 TABLET BY MOUTH EVERY DAY TABLET ORAL
Qty: 30 EACH | Refills: 0 | Status: ACTIVE | COMMUNITY

## 2024-06-11 RX ORDER — PANTOPRAZOLE 20 MG/1
1 TABLET, DELAYED RELEASE ORAL
Qty: 0 | Refills: 2 | Status: ON HOLD | COMMUNITY
Start: 2022-04-29

## 2024-06-11 RX ORDER — RIFAXIMIN 550 MG/1
1 TABLET TABLET ORAL TWICE A DAY
Status: ACTIVE | COMMUNITY
Start: 2023-02-07

## 2024-06-11 RX ORDER — FOLIC ACID 1 MG/1
1 (ONE) TABLET ORAL DAILY
OUTPATIENT
Start: 2022-04-29

## 2024-06-11 RX ORDER — METOPROLOL SUCCINATE 25 MG/1
TABLET, EXTENDED RELEASE ORAL TWICE A DAY
Refills: 0 | Status: ON HOLD | COMMUNITY
Start: 2020-09-23

## 2024-06-11 RX ORDER — SPIRONOLACTONE 25 MG/1
1 TABLET TABLET ORAL DAILY
Status: ACTIVE | COMMUNITY

## 2024-06-11 RX ORDER — TAMSULOSIN HYDROCHLORIDE 0.4 MG/1
TAKE 1 CAPSULE BY MOUTH NIGHTLY CAPSULE ORAL
Qty: 30 EACH | Refills: 0 | Status: ACTIVE | COMMUNITY

## 2024-06-11 RX ORDER — DICYCLOMINE HYDROCHLORIDE 10 MG/1
1 CAPSULE CAPSULE ORAL
Qty: 270 | Refills: 0 | Status: ACTIVE | COMMUNITY
Start: 2024-02-26 | End: 2024-08-26

## 2024-06-11 RX ORDER — FUROSEMIDE 20 MG/1
1 TABLET TABLET ORAL ONCE A DAY
OUTPATIENT
Start: 2023-05-16

## 2024-06-11 RX ORDER — OXYCODONE HYDROCHLORIDE 5 MG/1
1 CAPSULE AS NEEDED CAPSULE ORAL AS NEEDED
Refills: 0 | Status: ACTIVE | COMMUNITY

## 2024-06-11 RX ORDER — FOLIC ACID 1 MG/1
1 (ONE) TABLET ORAL DAILY
Status: ON HOLD | COMMUNITY
Start: 2022-04-29

## 2024-06-11 RX ORDER — FUROSEMIDE 20 MG/1
1 TABLET TABLET ORAL ONCE A DAY
Qty: 30 | Refills: 5 | Status: ACTIVE | COMMUNITY
Start: 2023-05-16

## 2024-06-11 RX ORDER — PANTOPRAZOLE SODIUM 40 MG/1
AS DIRECTED TABLET, DELAYED RELEASE ORAL TWICE A DAY
Qty: 60 | Refills: 2 | Status: ACTIVE | COMMUNITY

## 2024-06-11 RX ORDER — RIFAXIMIN 550 MG/1
1 TABLET TABLET ORAL TWICE A DAY
OUTPATIENT
Start: 2023-02-07

## 2024-06-11 RX ORDER — SPIRONOLACTONE 25 MG/1
1 TABLET TABLET ORAL DAILY
OUTPATIENT

## 2024-06-11 NOTE — HPI-TODAY'S VISIT:
Pt here for f/u of etoh cirrhosis and IBS and radiation proctitis . pt has etoh cirrhosis, finalliy sober as of 6/2024 Hx/o SCC anus . 2020 Colon negative 2021 EGD with small varices 2021 Liver bx with cirrhosis and fatty liver changes (ANTELMO) 2022 colon with polyp and radiation proctitis 2022 EGD with duodenal AVMs 2023 Colon with APC of extensive radiation proctitis . Prior IVDU, none currently . 12/2022 CT a/p negative for tumor 3/2023 MRI liver neg for tumor 8/2023 MRI liver with cirrhosis no tumor . 12/2022 MELD 10 4/2023 MELD 14 7/2023 CBC with drop in hgb to 5.8.  8/2023 AFP elevated to 12 . Pt has finally decided to stop drinking. He continues to have a high MELD and DF but now that he is abstaining my hope is that may partially improve. He has f[facility] and has urology f/u for his hematuria. RTC 2 mohnths with repeat labs in 1 month. . .

## 2024-06-28 ENCOUNTER — TELEPHONE ENCOUNTER (OUTPATIENT)
Dept: URBAN - METROPOLITAN AREA CLINIC 9 | Facility: CLINIC | Age: 54
End: 2024-06-28

## 2024-06-28 RX ORDER — PANTOPRAZOLE SODIUM 40 MG/1
1 TABLET TABLET, DELAYED RELEASE ORAL TWICE A DAY
Qty: 60 TABLET | Refills: 5

## 2024-06-28 RX ORDER — SPIRONOLACTONE 25 MG/1
1 TABLET TABLET ORAL DAILY
Qty: 30 TABLET | Refills: 5

## 2024-08-13 ENCOUNTER — TELEPHONE ENCOUNTER (OUTPATIENT)
Dept: URBAN - METROPOLITAN AREA CLINIC 9 | Facility: CLINIC | Age: 54
End: 2024-08-13

## 2024-08-13 RX ORDER — RIFAXIMIN 550 MG/1
1 TABLET TABLET ORAL TWICE A DAY
Qty: 60 TABLET | Refills: 3
Start: 2023-02-07 | End: 2024-12-17

## 2024-08-14 ENCOUNTER — OFFICE VISIT (OUTPATIENT)
Dept: URBAN - METROPOLITAN AREA CLINIC 9 | Facility: CLINIC | Age: 54
End: 2024-08-14
Payer: COMMERCIAL

## 2024-08-14 VITALS
WEIGHT: 175 LBS | BODY MASS INDEX: 20.25 KG/M2 | HEIGHT: 78 IN | DIASTOLIC BLOOD PRESSURE: 72 MMHG | SYSTOLIC BLOOD PRESSURE: 118 MMHG

## 2024-08-14 DIAGNOSIS — K70.30 ALCOHOLIC CIRRHOSIS OF LIVER WITHOUT ASCITES: ICD-10-CM

## 2024-08-14 DIAGNOSIS — K21.9 GASTROESOPHAGEAL REFLUX DISEASE WITHOUT ESOPHAGITIS: ICD-10-CM

## 2024-08-14 DIAGNOSIS — K62.7 RADIATION PROCTITIS: ICD-10-CM

## 2024-08-14 DIAGNOSIS — K76.82 HEPATIC ENCEPHALOPATHY: ICD-10-CM

## 2024-08-14 PROCEDURE — 99214 OFFICE O/P EST MOD 30 MIN: CPT | Performed by: INTERNAL MEDICINE

## 2024-08-14 RX ORDER — SPIRONOLACTONE 25 MG/1
1 TABLET TABLET ORAL DAILY
OUTPATIENT

## 2024-08-14 RX ORDER — RIFAXIMIN 550 MG/1
1 TABLET TABLET ORAL TWICE A DAY
OUTPATIENT
Start: 2023-02-07

## 2024-08-14 RX ORDER — TAMSULOSIN HYDROCHLORIDE 0.4 MG/1
TAKE 1 CAPSULE BY MOUTH NIGHTLY CAPSULE ORAL
Qty: 30 EACH | Refills: 0 | Status: ACTIVE | COMMUNITY

## 2024-08-14 RX ORDER — FINASTERIDE 5 MG/1
TAKE 1 TABLET BY MOUTH EVERY DAY TABLET ORAL
Qty: 30 EACH | Refills: 0 | Status: ACTIVE | COMMUNITY

## 2024-08-14 RX ORDER — OXYCODONE HYDROCHLORIDE 5 MG/1
1 CAPSULE AS NEEDED CAPSULE ORAL AS NEEDED
Refills: 0 | Status: ACTIVE | COMMUNITY

## 2024-08-14 RX ORDER — PANTOPRAZOLE SODIUM 40 MG/1
1 TABLET TABLET, DELAYED RELEASE ORAL TWICE A DAY
OUTPATIENT

## 2024-08-14 RX ORDER — METOPROLOL SUCCINATE 25 MG/1
TABLET, EXTENDED RELEASE ORAL TWICE A DAY
Refills: 0 | Status: ON HOLD | COMMUNITY
Start: 2020-09-23

## 2024-08-14 RX ORDER — PANTOPRAZOLE 20 MG/1
1 TABLET, DELAYED RELEASE ORAL
Qty: 0 | Refills: 2 | Status: ON HOLD | COMMUNITY
Start: 2022-04-29

## 2024-08-14 RX ORDER — FUROSEMIDE 20 MG/1
1 TABLET TABLET ORAL ONCE A DAY
OUTPATIENT
Start: 2023-05-16

## 2024-08-14 RX ORDER — LIDOCAINE 50 MG/G
TAKE AS DIRECTED; AS NEEDED FOR RECTAL BLEEDING CREAM TOPICAL TAKE AS DIRECTED
Refills: 0 | Status: ON HOLD | COMMUNITY
Start: 2020-09-23

## 2024-08-14 RX ORDER — FOLIC ACID 1 MG/1
1 (ONE) TABLET ORAL DAILY
Status: ACTIVE | COMMUNITY
Start: 2022-04-29

## 2024-08-14 RX ORDER — PANTOPRAZOLE SODIUM 40 MG/1
1 TABLET TABLET, DELAYED RELEASE ORAL TWICE A DAY
Qty: 60 TABLET | Refills: 5 | Status: ACTIVE | COMMUNITY

## 2024-08-14 RX ORDER — DICYCLOMINE HYDROCHLORIDE 10 MG/1
1 CAPSULE CAPSULE ORAL
Qty: 270 | Refills: 0 | Status: ACTIVE | COMMUNITY
Start: 2024-02-26 | End: 2024-08-26

## 2024-08-14 RX ORDER — METOPROLOL TARTRATE 25 MG/1
TABLET, FILM COATED ORAL TWICE A DAY
Refills: 0 | Status: ACTIVE | COMMUNITY
Start: 2021-03-10

## 2024-08-14 RX ORDER — RIFAXIMIN 550 MG/1
1 TABLET TABLET ORAL TWICE A DAY
Status: ACTIVE | COMMUNITY
Start: 2023-02-07

## 2024-08-14 RX ORDER — SPIRONOLACTONE 25 MG/1
1 TABLET TABLET ORAL DAILY
Qty: 30 TABLET | Refills: 5 | Status: ACTIVE | COMMUNITY

## 2024-08-14 RX ORDER — FUROSEMIDE 20 MG/1
1 TABLET TABLET ORAL ONCE A DAY
Status: ACTIVE | COMMUNITY
Start: 2023-05-16

## 2024-08-14 NOTE — HPI-TODAY'S VISIT:
Pt here for f/u of etoh cirrhosis and IBS and radiation proctitis . pt has etoh cirrhosis, finalliy sober as of 6/2024 Hx/o SCC anus . 2020 Colon negative 2021 EGD with small varices 2021 Liver bx with cirrhosis and fatty liver changes (ANTELMO) 2022 colon with polyp and radiation proctitis 2022 EGD with duodenal AVMs 2023 Colon with APC of extensive radiation proctitis . Prior IVDU, none currently . 12/2022 CT a/p negative for tumor 3/2023 MRI liver neg for tumor 8/2023 MRI liver with cirrhosis no tumor . 12/2022 MELD 10 4/2023 MELD 14 7/2023 CBC with drop in hgb to 5.8.  8/2023 AFP elevated to 12 . Pt here for f/u. He has ceased drinking and lfts are dramatically improved. he has prbc at [facility] this week but that is the first time in several months. I advised he should cont the octreotide to help with the radiation proctitis. Cont serial CBC at [facility] and cont avoidance of etoh. overall his LE edema and encephalopathy are stable. RTC 3-6 mo. . . .

## 2024-09-03 ENCOUNTER — TELEPHONE ENCOUNTER (OUTPATIENT)
Dept: URBAN - METROPOLITAN AREA CLINIC 9 | Facility: CLINIC | Age: 54
End: 2024-09-03

## 2024-09-03 RX ORDER — DICYCLOMINE HYDROCHLORIDE 10 MG/1
1 CAPSULE 30 MINUTES BEFORE EATING CAPSULE ORAL THREE TIMES A DAY
Qty: 270 | Refills: 0 | OUTPATIENT
Start: 2024-09-03 | End: 2024-12-01

## 2024-12-10 ENCOUNTER — TELEPHONE ENCOUNTER (OUTPATIENT)
Dept: URBAN - METROPOLITAN AREA CLINIC 9 | Facility: CLINIC | Age: 54
End: 2024-12-10

## 2024-12-10 RX ORDER — SPIRONOLACTONE 25 MG/1
1 TABLET TABLET ORAL DAILY
Qty: 30 TABLET | Refills: 5

## 2024-12-10 RX ORDER — PANTOPRAZOLE SODIUM 40 MG/1
1 TABLET TABLET, DELAYED RELEASE ORAL TWICE A DAY
Qty: 60 TABLET | Refills: 5

## 2024-12-10 RX ORDER — FUROSEMIDE 20 MG/1
1 TABLET TABLET ORAL ONCE A DAY
Qty: 30 | Refills: 5
Start: 2023-05-16 | End: 2025-06-08

## 2025-02-10 ENCOUNTER — TELEPHONE ENCOUNTER (OUTPATIENT)
Dept: URBAN - METROPOLITAN AREA CLINIC 9 | Facility: CLINIC | Age: 55
End: 2025-02-10

## 2025-02-10 ENCOUNTER — OFFICE VISIT (OUTPATIENT)
Dept: URBAN - METROPOLITAN AREA CLINIC 9 | Facility: CLINIC | Age: 55
End: 2025-02-10
Payer: COMMERCIAL

## 2025-02-10 ENCOUNTER — TELEPHONE ENCOUNTER (OUTPATIENT)
Dept: URBAN - METROPOLITAN AREA CLINIC 7 | Facility: CLINIC | Age: 55
End: 2025-02-10

## 2025-02-10 VITALS
HEIGHT: 78 IN | DIASTOLIC BLOOD PRESSURE: 72 MMHG | BODY MASS INDEX: 22.1 KG/M2 | WEIGHT: 191 LBS | SYSTOLIC BLOOD PRESSURE: 121 MMHG

## 2025-02-10 DIAGNOSIS — K62.7 RADIATION PROCTITIS: ICD-10-CM

## 2025-02-10 DIAGNOSIS — E83.00 LOW CERULOPLASMIN LEVEL: ICD-10-CM

## 2025-02-10 DIAGNOSIS — K70.30 ALCOHOLIC CIRRHOSIS OF LIVER WITHOUT ASCITES: ICD-10-CM

## 2025-02-10 DIAGNOSIS — R11.2 NAUSEA AND VOMITING, UNSPECIFIED VOMITING TYPE: ICD-10-CM

## 2025-02-10 DIAGNOSIS — C21.0 SQUAMOUS CELL CANCER, ANUS: ICD-10-CM

## 2025-02-10 PROCEDURE — 99214 OFFICE O/P EST MOD 30 MIN: CPT | Performed by: PHYSICIAN ASSISTANT

## 2025-02-10 RX ORDER — SPIRONOLACTONE 25 MG/1
1 TABLET TABLET ORAL DAILY
Qty: 30 TABLET | Refills: 5 | Status: ACTIVE | COMMUNITY

## 2025-02-10 RX ORDER — FOLIC ACID 1 MG/1
1 (ONE) TABLET ORAL DAILY
Status: ACTIVE | COMMUNITY
Start: 2022-04-29

## 2025-02-10 RX ORDER — FINASTERIDE 5 MG/1
TAKE 1 TABLET BY MOUTH EVERY DAY TABLET ORAL
Qty: 30 EACH | Refills: 0 | Status: ACTIVE | COMMUNITY

## 2025-02-10 RX ORDER — OCTREOTIDE ACETATE 20 MG
AS DIRECTED KIT INTRAMUSCULAR
Qty: 1 | Refills: 7 | OUTPATIENT
Start: 2025-02-10 | End: 2025-10-08

## 2025-02-10 RX ORDER — TAMSULOSIN HYDROCHLORIDE 0.4 MG/1
TAKE 1 CAPSULE BY MOUTH NIGHTLY CAPSULE ORAL
Qty: 30 EACH | Refills: 0 | Status: ACTIVE | COMMUNITY

## 2025-02-10 RX ORDER — RIFAXIMIN 550 MG/1
1 TABLET TABLET ORAL TWICE A DAY
Status: ACTIVE | COMMUNITY
Start: 2023-02-07

## 2025-02-10 RX ORDER — GABAPENTIN 300 MG/1
1 CAPSULE CAPSULE ORAL ONCE A DAY
Status: ACTIVE | COMMUNITY

## 2025-02-10 RX ORDER — COLESTIPOL HYDROCHLORIDE 1 G/1
2 TABLETS TABLET, FILM COATED ORAL TWICE A DAY
Qty: 120 TABLET | OUTPATIENT
Start: 2025-02-10

## 2025-02-10 RX ORDER — OXYCODONE HYDROCHLORIDE 5 MG/1
1 CAPSULE AS NEEDED CAPSULE ORAL AS NEEDED
Refills: 0 | Status: ACTIVE | COMMUNITY

## 2025-02-10 RX ORDER — METOPROLOL TARTRATE 25 MG/1
TABLET, FILM COATED ORAL TWICE A DAY
Refills: 0 | Status: ACTIVE | COMMUNITY
Start: 2021-03-10

## 2025-02-10 RX ORDER — METOPROLOL SUCCINATE 25 MG/1
TABLET, EXTENDED RELEASE ORAL TWICE A DAY
Refills: 0 | Status: ON HOLD | COMMUNITY
Start: 2020-09-23

## 2025-02-10 RX ORDER — PANTOPRAZOLE SODIUM 40 MG/1
1 TABLET TABLET, DELAYED RELEASE ORAL TWICE A DAY
Qty: 60 TABLET | Refills: 5 | Status: ACTIVE | COMMUNITY

## 2025-02-10 RX ORDER — PANTOPRAZOLE 20 MG/1
1 TABLET, DELAYED RELEASE ORAL
Qty: 0 | Refills: 2 | Status: ON HOLD | COMMUNITY
Start: 2022-04-29

## 2025-02-10 RX ORDER — FUROSEMIDE 20 MG/1
1 TABLET TABLET ORAL ONCE A DAY
Qty: 30 | Refills: 5 | Status: ACTIVE | COMMUNITY
Start: 2023-05-16 | End: 2025-06-08

## 2025-02-10 RX ORDER — LIDOCAINE 50 MG/G
TAKE AS DIRECTED; AS NEEDED FOR RECTAL BLEEDING CREAM TOPICAL TAKE AS DIRECTED
Refills: 0 | Status: ON HOLD | COMMUNITY
Start: 2020-09-23

## 2025-02-10 NOTE — HPI-TODAY'S VISIT:
Sleep pt here for f/u of etoh cirrhosis, IBS, stage II anal cancer, invasive, and radiation proctitis . pt has etoh cirrhosis, finalliy sober as of 6/2024 Prior IVDU, none currently  2020 Colon negative 2021 EGD with small varices 2021 Liver bx with cirrhosis and fatty liver changes (ANTELMO) 2022 colon with polyp and radiation proctitis 2022 EGD with duodenal AVMs 2023 Colon with APC of extensive radiation proctitis ? most recent colon - colorectal surgeon. .  12/2022 CT a/p negative for tumor 3/2023 MRI liver neg for tumor 8/2023 MRI liver with cirrhosis no tumor 12/2024, serum copper low, at 65-( nl range).  Ammonia level normal.  Hepatic ultrasound 1/10/2025-no evidence of portal vein or splenic vein thrombosis . 12/2022 MELD 10 4/2023 MELD 14 7/2023 CBC with drop in hgb to 5.8.  8/2023 AFP elevated to 12 12/2024, serum copper low, at 65-( nl range).  Ammonia level normal. Hepatic ultrasound 1/10/2025-no evidence of portal vein or splenic vein thrombosis Labs 1/27/25 - Hg 7.1/Hct23.9.   MELD score - using 6/24 data is 22.   . Pt here for f/u. He has ceased drinking and lfts are dramatically improved. he has prbc at [facility] this week but that is the first time in several months. I advised he should cont the octreotide to help with the radiation proctitis. Cont serial CBC at [facility] and cont avoidance of etoh. overall his LE edema and encephalopathy are stable. RTC 3-6 mo. . Interval hx 2/10/25 -  Pt is feeling awful.  Has begun to get episodes of sweating, dizziness, tremors, nausea with vomiting daily, lower abdominal pain.  Had a blood transfusion last week - think anemia related to radiation proctitis and/or cirrhosis.  Having days with alot of bleeding and diarrhea and somedays with none. Transfusion did not help with symptoms.Having labs later today. Advised checking his BS at home during an episode - they have the abilty to do this.  Call if < 80. If < 60- ingest some orange juice.  WIfe feels encepalopathy stable overall. Diruetics working well for edema. Followed by [facility] for anemia. Had a recent transfusion.  ? symptoms still related to anemia.  Denies SOB.   His wt is actually up since last visit.  Discussed fall precautions. He denies rectal or other GI bleeding, melena.  He is having alot of generalzed pruritis.  ALP, AST/ALT all up. Ceruloplasmin was a bit low - will check a 24 hour urinary copper. He is not taking octreotide, will restart. MELD score using labs from 6/2024 very high at 22 - he is having labs done later today.  Will obtain and recalcuate but at this level, he needs a referral to tertiary center for evaluation for transplant.  He is no longer drinking as of 6/2024.  No recent CT, will order triple phase today. No recent AFT - will order No recent EGED - has hx of small varices and duodenal AVMs.   RTC 6 weeks to f/u on all ordered today. Discussed going to the ED as symptoms are significant at this time.  They are thinking that they will wait to see what labs are going to reveal.  .

## 2025-02-10 NOTE — PHYSICAL EXAM GASTROINTESTINAL
Abdomen , soft, nontender, nondistended , no guarding or rigidity , no masses palpable , normal bowel sounds , Liver and Spleen,  no hepatosplenomegaly , liver nontender. No appreciable ascites.

## 2025-02-11 ENCOUNTER — OFFICE VISIT (OUTPATIENT)
Dept: URBAN - METROPOLITAN AREA CLINIC 9 | Facility: CLINIC | Age: 55
End: 2025-02-11

## 2025-02-14 ENCOUNTER — TELEPHONE ENCOUNTER (OUTPATIENT)
Dept: URBAN - METROPOLITAN AREA CLINIC 9 | Facility: CLINIC | Age: 55
End: 2025-02-14

## 2025-02-24 ENCOUNTER — OFFICE VISIT (OUTPATIENT)
Dept: URBAN - METROPOLITAN AREA SURGERY CENTER 9 | Facility: SURGERY CENTER | Age: 55
End: 2025-02-24
Payer: COMMERCIAL

## 2025-02-24 ENCOUNTER — CLAIMS CREATED FROM THE CLAIM WINDOW (OUTPATIENT)
Dept: URBAN - METROPOLITAN AREA CLINIC 4 | Facility: CLINIC | Age: 55
End: 2025-02-24
Payer: COMMERCIAL

## 2025-02-24 DIAGNOSIS — K76.6 PORTAL HYPERTENSION: ICD-10-CM

## 2025-02-24 DIAGNOSIS — K29.70 GASTRITIS: ICD-10-CM

## 2025-02-24 DIAGNOSIS — K31.89 OTHER DISEASES OF STOMACH AND DUODENUM: ICD-10-CM

## 2025-02-24 DIAGNOSIS — K74.60 HEPATIC CIRRHOSIS, UNSPECIFIED HEPATIC CIRRHOSIS TYPE, UNSPECIFIED WHETHER ASCITES PRESENT: ICD-10-CM

## 2025-02-24 DIAGNOSIS — K29.70 GASTRITIS, UNSPECIFIED, WITHOUT BLEEDING: ICD-10-CM

## 2025-02-24 DIAGNOSIS — K22.89 OTHER SPECIFIED DISEASE OF ESOPHAGUS: ICD-10-CM

## 2025-02-24 DIAGNOSIS — K29.70 GASTRITIS WITHOUT BLEEDING, UNSPECIFIED CHRONICITY, UNSPECIFIED GASTRITIS TYPE: ICD-10-CM

## 2025-02-24 DIAGNOSIS — K74.60 CIRRHOSIS: ICD-10-CM

## 2025-02-24 PROCEDURE — 88305 TISSUE EXAM BY PATHOLOGIST: CPT | Performed by: PATHOLOGY

## 2025-02-24 PROCEDURE — 43239 EGD BIOPSY SINGLE/MULTIPLE: CPT | Performed by: INTERNAL MEDICINE

## 2025-02-24 PROCEDURE — 00731 ANES UPR GI NDSC PX NOS: CPT | Performed by: NURSE ANESTHETIST, CERTIFIED REGISTERED

## 2025-02-24 PROCEDURE — 88312 SPECIAL STAINS GROUP 1: CPT | Performed by: PATHOLOGY

## 2025-02-24 RX ORDER — TAMSULOSIN HYDROCHLORIDE 0.4 MG/1
TAKE 1 CAPSULE BY MOUTH NIGHTLY CAPSULE ORAL
Qty: 30 EACH | Refills: 0 | Status: ACTIVE | COMMUNITY

## 2025-02-24 RX ORDER — LIDOCAINE 50 MG/G
TAKE AS DIRECTED; AS NEEDED FOR RECTAL BLEEDING CREAM TOPICAL TAKE AS DIRECTED
Refills: 0 | Status: ON HOLD | COMMUNITY
Start: 2020-09-23

## 2025-02-24 RX ORDER — FUROSEMIDE 20 MG/1
1 TABLET TABLET ORAL ONCE A DAY
Qty: 30 | Refills: 5 | Status: ACTIVE | COMMUNITY
Start: 2023-05-16 | End: 2025-06-08

## 2025-02-24 RX ORDER — OXYCODONE HYDROCHLORIDE 5 MG/1
1 CAPSULE AS NEEDED CAPSULE ORAL AS NEEDED
Refills: 0 | Status: ACTIVE | COMMUNITY

## 2025-02-24 RX ORDER — GABAPENTIN 300 MG/1
1 CAPSULE CAPSULE ORAL ONCE A DAY
Status: ACTIVE | COMMUNITY

## 2025-02-24 RX ORDER — PANTOPRAZOLE SODIUM 40 MG/1
1 TABLET TABLET, DELAYED RELEASE ORAL TWICE A DAY
Qty: 60 TABLET | Refills: 5 | Status: ACTIVE | COMMUNITY

## 2025-02-24 RX ORDER — OCTREOTIDE ACETATE 20 MG
AS DIRECTED KIT INTRAMUSCULAR
Qty: 1 | Refills: 7 | Status: ACTIVE | COMMUNITY
Start: 2025-02-10 | End: 2025-10-08

## 2025-02-24 RX ORDER — PANTOPRAZOLE 20 MG/1
1 TABLET, DELAYED RELEASE ORAL
Qty: 0 | Refills: 2 | Status: ON HOLD | COMMUNITY
Start: 2022-04-29

## 2025-02-24 RX ORDER — FOLIC ACID 1 MG/1
1 (ONE) TABLET ORAL DAILY
Status: ACTIVE | COMMUNITY
Start: 2022-04-29

## 2025-02-24 RX ORDER — RIFAXIMIN 550 MG/1
1 TABLET TABLET ORAL TWICE A DAY
Status: ACTIVE | COMMUNITY
Start: 2023-02-07

## 2025-02-24 RX ORDER — METOPROLOL TARTRATE 25 MG/1
TABLET, FILM COATED ORAL TWICE A DAY
Refills: 0 | Status: ACTIVE | COMMUNITY
Start: 2021-03-10

## 2025-02-24 RX ORDER — COLESTIPOL HYDROCHLORIDE 1 G/1
2 TABLETS TABLET, FILM COATED ORAL TWICE A DAY
Qty: 120 TABLET | Status: ACTIVE | COMMUNITY
Start: 2025-02-10

## 2025-02-24 RX ORDER — FINASTERIDE 5 MG/1
TAKE 1 TABLET BY MOUTH EVERY DAY TABLET ORAL
Qty: 30 EACH | Refills: 0 | Status: ACTIVE | COMMUNITY

## 2025-02-24 RX ORDER — SPIRONOLACTONE 25 MG/1
1 TABLET TABLET ORAL DAILY
Qty: 30 TABLET | Refills: 5 | Status: ACTIVE | COMMUNITY

## 2025-02-24 RX ORDER — METOPROLOL SUCCINATE 25 MG/1
TABLET, EXTENDED RELEASE ORAL TWICE A DAY
Refills: 0 | Status: ON HOLD | COMMUNITY
Start: 2020-09-23

## 2025-03-05 ENCOUNTER — ERX REFILL RESPONSE (OUTPATIENT)
Dept: URBAN - METROPOLITAN AREA CLINIC 9 | Facility: CLINIC | Age: 55
End: 2025-03-05

## 2025-03-05 RX ORDER — COLESTIPOL HYDROCHLORIDE 1 G/1
2 TABLETS TABLET, FILM COATED ORAL TWICE A DAY
Qty: 360 TABLET | Refills: 1 | OUTPATIENT

## 2025-03-05 RX ORDER — COLESTIPOL HYDROCHLORIDE 1 G/1
2 TABLETS TABLET, FILM COATED ORAL TWICE A DAY
Qty: 120 TABLET | OUTPATIENT

## 2025-03-06 ENCOUNTER — TELEPHONE ENCOUNTER (OUTPATIENT)
Dept: URBAN - METROPOLITAN AREA CLINIC 9 | Facility: CLINIC | Age: 55
End: 2025-03-06

## 2025-03-06 RX ORDER — DICYCLOMINE HYDROCHLORIDE 10 MG/1
1 CAPSULE CAPSULE ORAL THREE TIMES A DAY
Qty: 270 CAPSULE | Refills: 1 | OUTPATIENT
Start: 2025-03-06 | End: 2025-09-02

## 2025-03-19 ENCOUNTER — P2P PATIENT RECORD (OUTPATIENT)
Age: 55
End: 2025-03-19

## 2025-03-27 ENCOUNTER — WEB ENCOUNTER (OUTPATIENT)
Dept: URBAN - METROPOLITAN AREA CLINIC 9 | Facility: CLINIC | Age: 55
End: 2025-03-27

## 2025-04-21 ENCOUNTER — OFFICE VISIT (OUTPATIENT)
Dept: URBAN - METROPOLITAN AREA CLINIC 9 | Facility: CLINIC | Age: 55
End: 2025-04-21
Payer: COMMERCIAL

## 2025-04-21 DIAGNOSIS — K70.30 ALCOHOLIC CIRRHOSIS OF LIVER WITHOUT ASCITES: ICD-10-CM

## 2025-04-21 DIAGNOSIS — K21.9 GASTROESOPHAGEAL REFLUX DISEASE WITHOUT ESOPHAGITIS: ICD-10-CM

## 2025-04-21 DIAGNOSIS — K62.7 RADIATION PROCTITIS: ICD-10-CM

## 2025-04-21 PROCEDURE — 99214 OFFICE O/P EST MOD 30 MIN: CPT | Performed by: PHYSICIAN ASSISTANT

## 2025-04-21 RX ORDER — PANTOPRAZOLE SODIUM 40 MG/1
1 TABLET TABLET, DELAYED RELEASE ORAL TWICE A DAY
Qty: 60 TABLET | Refills: 5 | Status: ACTIVE | COMMUNITY

## 2025-04-21 RX ORDER — METOPROLOL TARTRATE 25 MG/1
TABLET, FILM COATED ORAL TWICE A DAY
Refills: 0 | Status: ACTIVE | COMMUNITY
Start: 2021-03-10

## 2025-04-21 RX ORDER — SPIRONOLACTONE 25 MG/1
1 TABLET TABLET ORAL DAILY
Qty: 30 TABLET | Refills: 5 | Status: ACTIVE | COMMUNITY

## 2025-04-21 RX ORDER — PANTOPRAZOLE 20 MG/1
1 TABLET, DELAYED RELEASE ORAL
Qty: 0 | Refills: 2 | Status: ON HOLD | COMMUNITY
Start: 2022-04-29

## 2025-04-21 RX ORDER — METOPROLOL SUCCINATE 25 MG/1
TABLET, EXTENDED RELEASE ORAL TWICE A DAY
Refills: 0 | Status: ON HOLD | COMMUNITY
Start: 2020-09-23

## 2025-04-21 RX ORDER — OXYCODONE HYDROCHLORIDE 5 MG/1
1 CAPSULE AS NEEDED CAPSULE ORAL AS NEEDED
Refills: 0 | Status: ACTIVE | COMMUNITY

## 2025-04-21 RX ORDER — OCTREOTIDE ACETATE 20 MG
AS DIRECTED KIT INTRAMUSCULAR
Qty: 1 | Refills: 7 | OUTPATIENT
Start: 2025-04-21

## 2025-04-21 RX ORDER — COLESTIPOL HYDROCHLORIDE 1 G/1
2 TABLETS TABLET, FILM COATED ORAL TWICE A DAY
Qty: 360 TABLET | Refills: 1 | Status: ACTIVE | COMMUNITY

## 2025-04-21 RX ORDER — FUROSEMIDE 20 MG/1
1 TABLET TABLET ORAL ONCE A DAY
Qty: 30 | Refills: 5 | Status: ACTIVE | COMMUNITY
Start: 2023-05-16 | End: 2025-06-08

## 2025-04-21 RX ORDER — FOLIC ACID 1 MG/1
1 (ONE) TABLET ORAL DAILY
Status: ACTIVE | COMMUNITY
Start: 2022-04-29

## 2025-04-21 RX ORDER — TAMSULOSIN HYDROCHLORIDE 0.4 MG/1
TAKE 1 CAPSULE BY MOUTH NIGHTLY CAPSULE ORAL
Qty: 30 EACH | Refills: 0 | Status: DISCONTINUED | COMMUNITY

## 2025-04-21 RX ORDER — RIFAXIMIN 550 MG/1
1 TABLET TABLET ORAL TWICE A DAY
Status: ACTIVE | COMMUNITY
Start: 2023-02-07

## 2025-04-21 RX ORDER — LIDOCAINE 50 MG/G
TAKE AS DIRECTED; AS NEEDED FOR RECTAL BLEEDING CREAM TOPICAL TAKE AS DIRECTED
Refills: 0 | Status: ON HOLD | COMMUNITY
Start: 2020-09-23

## 2025-04-21 RX ORDER — GABAPENTIN 300 MG/1
1 CAPSULE CAPSULE ORAL ONCE A DAY
Status: ACTIVE | COMMUNITY

## 2025-04-21 RX ORDER — DICYCLOMINE HYDROCHLORIDE 10 MG/1
1 CAPSULE CAPSULE ORAL THREE TIMES A DAY
Qty: 270 CAPSULE | Refills: 1 | Status: ACTIVE | COMMUNITY
Start: 2025-03-06 | End: 2025-09-02

## 2025-04-21 RX ORDER — CHOLECALCIFEROL (VITAMIN D3) 50 MCG
1 TABLET TABLET ORAL ONCE A DAY
Status: ACTIVE | COMMUNITY

## 2025-04-21 RX ORDER — FINASTERIDE 5 MG/1
TAKE 1 TABLET BY MOUTH EVERY DAY TABLET ORAL
Qty: 30 EACH | Refills: 0 | Status: DISCONTINUED | COMMUNITY

## 2025-04-21 RX ORDER — OCTREOTIDE ACETATE 20 MG
AS DIRECTED KIT INTRAMUSCULAR
Qty: 1 | Refills: 7 | Status: DISCONTINUED | COMMUNITY
Start: 2025-02-10 | End: 2025-10-08

## 2025-04-21 NOTE — PHYSICAL EXAM GASTROINTESTINAL
Abdomen , soft, tender RUQ, hepatomegaly, slightly distended , no guarding or rigidity , no masses palpable , normal bowel sounds.

## 2025-04-21 NOTE — HPI-TODAY'S VISIT:
Pt here for f/u of etoh cirrhosis, IBS, stage II anal cancer, invasive, and radiation proctitis . pt has etoh cirrhosis, finalliy sober as of 6/2024 Prior IVDU, none currently  2020 Colon negative 2021 EGD with small varices 2021 Liver bx with cirrhosis and fatty liver changes (ANTELMO) 2022 colon with polyp and radiation proctitis 2022 EGD with duodenal AVMs 2023 Colon with APC of extensive radiation proctitis ? most recent colon - colorectal surgeon. EGD 2/24/2025 esophageal mucosal changes, portal hypertensive gastropathy, gastritis.  No varices noted.  Path-chemical/reactive gastropathy, no Jauregui's.  Repeat 3 years.   12/2022 CT a/p negative for tumor 3/2023 MRI liver neg for tumor 8/2023 MRI liver with cirrhosis no tumor 12/2024, serum copper low, at 65-( nl range).  Ammonia level normal. Hepatic ultrasound 1/10/2025-no evidence of portal vein or splenic vein thrombosis 7/2023 CBC with drop in hgb to 5.8.  8/2023 AFP elevated to 12 12/2024, serum copper low, at 65-( nl range).  Ammonia level normal. Hepatic ultrasound 1/10/2025-no evidence of portal vein or splenic vein thrombosis Labs 1/27/25 - Hg 7.1/Hct23.9.   Labs-3/2025 24-hour urinary copper was actually low. Labs 3/2025 AFP-NL at 5.8 edema. Labs 3/2025-Hgb 6, HCT 20.9. Labs-4/5/2025 H/H 8.1/25.8, platelets 65, sodium 134, creatinine 1.02, albumin 2.6, , AST 71, ALT 33, total bili 6.1, vitamin D less than 12.8-low no INR to calculate MELD.  Last INR 3/2025 1.5 RUQ US 4/2025 no evidence of acute abnormality.  Diffuse increased hepatic echogenicity.  No ascites. US portal duplex 4/2025-no gross evidence of portal venous thrombus, slightly increased hepatic artery resistive index. CT 3/2025 with acute uncomplicated interstitial pancreatitis, cirrhotic liver with portal hypertension noted.  No ascites.  Gastroesophageal portosystemic shunting noted.-Who ordered this? CT a/p with IV CON-4/2025 -tiny bilateral pleural effusions, prominent size of the portal vein/SMV.  Splenomegaly areas of colonic wall thickening, question colitis, left internal hernia containing fluid, mild body wall  . 12/2022 MELD 10 4/2023 MELD 14 6/2024  MELD very high at 22  4/2025 - MELD very high at 21  I advised he should cont the octreotide to help with the radiation proctitis. Cont serial CBC at [facility] and cont avoidance of etoh. overall his LE edema and encephalopathy are stable.   Interval hx 2/10/25 -  Pt is feeling awful.  Has begun to get episodes of sweating, dizziness, tremors, nausea with vomiting daily, lower abdominal pain.  Had a blood transfusion last week - think anemia related to radiation proctitis and/or cirrhosis.  Having days with alot of bleeding and diarrhea and somedays with none. Transfusion did not help with symptoms.Having labs later today. Advised checking his BS at home during an episode - they have the abilty to do this.  Call if < 80. If < 60- ingest some orange juice.  WIfe feels encepalopathy stable overall. Diruetics working well for edema. Followed by [facility] for anemia. Had a recent transfusion.  ? symptoms still related to anemia.  Denies SOB.   His wt is actually up since last visit.  He denies rectal or other GI bleeding, melena.  He is having alot of generalzed pruritis.  ALP, AST/ALT all up. Ceruloplasmin was a bit low - will check a 24 hour urinary copper. He is not taking octreotide, will restart. MELD score using labs from 6/2024 very high at 22 - he is having labs done later today.  Will obtain and recalcuate but at this level, he needs a referral to tertiary center for evaluation for transplant.  He is no longer drinking as of 6/2024.  RTC 6 weeks to f/u on all ordered today.  Interim visit 4/21/2025 54-year-old male with progressive cirrhosis who presents today for follow-up. He is jaundiced today. Having labs drawn later today.  I previuosly referred to Dr. Huntley but they do not remember this and states never got a call. Since last visit,  hospitalized for abdominal pain, nausea/vomiting and worsening neuropathy.  CT prior to admission revealed interstitial pancreatitis.  portal vein HTN.  Lipase was normal. No portal thrombosis dentified after further investigation. He was noted to have some possible colitis on CT but had no diarrhea.  Unsure why, but furosemide was discontinued but wife kept him on it. Continues spironolactone.   EGD performed since last visit with no varices noted. Triple phase CT still pending - insurance did not approve. AFP elevated recently, now normalized. He continues to be quite anemic  (d/t radiation proctitis) and receives iron infusions as well as intermittent transfusions. NOT ON OCTREOTIDE as insurance would not approve. Still no ETOH since 5/2024.  MELD currently - 21, now jaundiced.  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 States in remission.  Has been having neuropathy - pain management rxs opiods and now a bit constipated. He has tried lactulose in past - had terrible diarrhea. He is still having some vomiting w/o nausea multiple times/week.  Can occur at any time.     Have again advised patient to follow-up with tertiary care center. Has acitve rectal bleeding. Has radiation proctitis. Will try againg to get octreotide approved. Anemia has been worsening. Continue furosemide and continue spironolactone.  Had a long discussion with patient that he has portal hypertension and may benefit from TIPS procedure, thus the importance of tertiary care referral.  They have plans to go to [place] soon, referral already placed at last visit.  WIll check hepatic panel today. RTC prn - needs escatlated care at [place].

## 2025-06-04 ENCOUNTER — TELEPHONE ENCOUNTER (OUTPATIENT)
Dept: URBAN - METROPOLITAN AREA CLINIC 7 | Facility: CLINIC | Age: 55
End: 2025-06-04

## 2025-06-04 RX ORDER — FUROSEMIDE 20 MG/1
1 TABLET TABLET ORAL ONCE A DAY
Qty: 90 TABLET | Refills: 1
Start: 2023-05-16 | End: 2025-12-01

## 2025-06-04 RX ORDER — PANTOPRAZOLE SODIUM 40 MG/1
1 TABLET TABLET, DELAYED RELEASE ORAL TWICE A DAY
Qty: 180 TABLET | Refills: 1

## 2025-07-07 ENCOUNTER — OFFICE VISIT (OUTPATIENT)
Dept: URBAN - METROPOLITAN AREA CLINIC 9 | Facility: CLINIC | Age: 55
End: 2025-07-07